# Patient Record
Sex: FEMALE | Race: WHITE | NOT HISPANIC OR LATINO | Employment: OTHER | URBAN - METROPOLITAN AREA
[De-identification: names, ages, dates, MRNs, and addresses within clinical notes are randomized per-mention and may not be internally consistent; named-entity substitution may affect disease eponyms.]

---

## 2020-09-21 ENCOUNTER — HOSPITAL ENCOUNTER (EMERGENCY)
Facility: HOSPITAL | Age: 50
Discharge: HOME | End: 2020-09-21
Attending: EMERGENCY MEDICINE | Admitting: EMERGENCY MEDICINE
Payer: MEDICARE

## 2020-09-21 ENCOUNTER — APPOINTMENT (EMERGENCY)
Dept: RADIOLOGY | Facility: HOSPITAL | Age: 50
End: 2020-09-21
Payer: MEDICARE

## 2020-09-21 VITALS
HEART RATE: 75 BPM | OXYGEN SATURATION: 98 % | SYSTOLIC BLOOD PRESSURE: 144 MMHG | DIASTOLIC BLOOD PRESSURE: 70 MMHG | RESPIRATION RATE: 18 BRPM | TEMPERATURE: 98.3 F | WEIGHT: 210.4 LBS

## 2020-09-21 DIAGNOSIS — S93.601A SPRAIN OF RIGHT FOOT, INITIAL ENCOUNTER: Primary | ICD-10-CM

## 2020-09-21 PROCEDURE — 99283 EMERGENCY DEPT VISIT LOW MDM: CPT

## 2020-09-21 PROCEDURE — 73630 X-RAY EXAM OF FOOT: CPT

## 2020-09-21 PROCEDURE — 99284 EMERGENCY DEPT VISIT MOD MDM: CPT | Performed by: PHYSICIAN ASSISTANT

## 2020-09-21 PROCEDURE — 73610 X-RAY EXAM OF ANKLE: CPT

## 2020-09-21 NOTE — ED PROVIDER NOTES
History  Chief Complaint   Patient presents with    Foot Pain     patient here due to falling yesterday and thinks she may have broken something in her Right foot  painful if she walks a certain way on it  Also a little more swollen than normal      69-year-old female presents the emergency department with complaints of right-sided foot ankle pain after injury  States she was at home yesterday and trying to get the stove when she slipped on the wet floor  Reports that she slipped with her left foot out front of her and her right foot fell underneath  Presently complains of pain in the top of the foot and partially and ankle  History of previous open reduction and internal fixation of the right ankle after a fracture from falling down the steps  Concerned about possible hardware disruption  History provided by:  Patient   used: No        None       History reviewed  No pertinent past medical history  Past Surgical History:   Procedure Laterality Date    ANKLE SURGERY      2013       History reviewed  No pertinent family history  I have reviewed and agree with the history as documented  E-Cigarette/Vaping     E-Cigarette/Vaping Substances     Social History     Tobacco Use    Smoking status: Never Smoker    Smokeless tobacco: Never Used   Substance Use Topics    Alcohol use: Yes     Frequency: Monthly or less    Drug use: Never       Review of Systems   Constitutional: Negative for chills and fever  HENT: Negative for congestion, dental problem and sore throat  Respiratory: Negative for cough  Cardiovascular: Negative for chest pain  Gastrointestinal: Negative for abdominal pain  Musculoskeletal: Negative for back pain and neck pain  Foot pain     Skin: Negative for rash and wound  All other systems reviewed and are negative  Physical Exam  Physical Exam  Vitals signs and nursing note reviewed     Constitutional:       Appearance: She is well-developed  HENT:      Head: Normocephalic and atraumatic  Cardiovascular:      Rate and Rhythm: Normal rate and regular rhythm  Pulmonary:      Effort: Pulmonary effort is normal  No respiratory distress  Breath sounds: No wheezing, rhonchi or rales  Musculoskeletal:      Right ankle: She exhibits decreased range of motion  She exhibits no swelling, no ecchymosis, no deformity, no laceration and normal pulse  No tenderness  Right foot: Decreased range of motion  Normal capillary refill  Tenderness and bony tenderness present  No swelling, crepitus, deformity or laceration  Feet:    Skin:     General: Skin is warm and dry  Neurological:      Mental Status: She is alert and oriented to person, place, and time  Psychiatric:         Mood and Affect: Mood normal          Behavior: Behavior normal          Vital Signs  ED Triage Vitals [09/21/20 1425]   Temperature Pulse Respirations Blood Pressure SpO2   98 3 °F (36 8 °C) 75 18 144/70 98 %      Temp src Heart Rate Source Patient Position - Orthostatic VS BP Location FiO2 (%)   -- Monitor Sitting Left arm --      Pain Score       4           Vitals:    09/21/20 1425   BP: 144/70   Pulse: 75   Patient Position - Orthostatic VS: Sitting         Visual Acuity      ED Medications  Medications - No data to display    Diagnostic Studies  Results Reviewed     None                 XR foot 3+ views RIGHT   ED Interpretation by Rochelle Daniels PA-C (09/21 1643)   No fracture       Final Result by Chance Chambers MD (09/21 1651)      No acute osseous abnormality  Workstation performed: [id number]         XR ankle 3+ views RIGHT   ED Interpretation by Rochelle Daniels PA-C (09/21 1644)   No fracture  Hardware intact       Final Result by Chance Chambers MD (09/21 1657)      Acute versus chronic medial malleolus avulsion fracture  The preliminary report in Epic does not describe the above finding        The study was marked in [place] [facility] for immediate notification  Workstation performed: [id number]                    Procedures  Procedures         ED Course                                       MDM  Number of Diagnoses or Management Options  Sprain of right foot, initial encounter:   Diagnosis management comments: Differential diagnosis includes but not limited to: foot sprain, foot fracture, ankle sprain, ankle fracture, hardware disruption       Amount and/or Complexity of Data Reviewed  Tests in the radiology section of CPT®: ordered and reviewed  Independent visualization of images, tracings, or specimens: yes        Disposition  Final diagnoses:   Sprain of right foot, initial encounter     Time reflects when diagnosis was documented in both MDM as applicable and the Disposition within this note     Time User Action Codes Description Comment    9/21/2020  4:44 PM [address] [place], [id number] Remy Burns Sprain of right foot, initial encounter       ED Disposition     ED Disposition Condition Date/Time Comment    Discharge Stable Mon Sep 21, 2020  4:44 PM [facility] discharge to home/self care  Follow-up Information     Follow up With Specialties Details Why Contact Info    [facility] (OUTPATIENT CAMP[facility])   [address] [place]   [phone]            Patient's Medications    No medications on file     No discharge procedures on file      PDMP Review     None          ED Provider  Electronically Signed by           Alisha Disla PA-C  09/21/20 6122

## 2020-09-21 NOTE — ED NOTES
PT awake and alert, no distress noted  No other questions upon d/c       April Steven Méndez, RN  09/21/20 7210

## 2022-07-19 ENCOUNTER — VBI (OUTPATIENT)
Dept: ADMINISTRATIVE | Facility: OTHER | Age: 52
End: 2022-07-19

## 2023-08-23 ENCOUNTER — VBI (OUTPATIENT)
Dept: ADMINISTRATIVE | Facility: OTHER | Age: 53
End: 2023-08-23

## 2024-02-09 ENCOUNTER — VBI (OUTPATIENT)
Dept: ADMINISTRATIVE | Facility: OTHER | Age: 54
End: 2024-02-09

## 2024-06-18 ENCOUNTER — VBI (OUTPATIENT)
Dept: ADMINISTRATIVE | Facility: OTHER | Age: 54
End: 2024-06-18

## 2024-06-18 NOTE — TELEPHONE ENCOUNTER
06/18/24 3:29 PM     Chart reviewed for CRC: Colonoscopy and Mammogram ; nothing is submitted to the patient's insurance at this time.     Laura Brizuela   [facility]

## 2025-04-09 ENCOUNTER — VBI (OUTPATIENT)
Dept: ADMINISTRATIVE | Facility: OTHER | Age: 55
End: 2025-04-09

## 2025-04-09 NOTE — TELEPHONE ENCOUNTER
04/09/25 7:34 AM     Chart reviewed for Mammogram ; nothing is submitted to the patient's insurance at this time.     Laura Brizuela   [facility]

## 2025-06-27 ENCOUNTER — VBI (OUTPATIENT)
Dept: ADMINISTRATIVE | Facility: OTHER | Age: 55
End: 2025-06-27

## 2025-06-27 NOTE — TELEPHONE ENCOUNTER
06/27/25 3:12 PM     Chart reviewed for CRC: Cologuard and CRC: Colonoscopy ; nothing is submitted to the patient's insurance at this time.     Laura Brizuela [facility]